# Patient Record
Sex: MALE | Race: WHITE
[De-identification: names, ages, dates, MRNs, and addresses within clinical notes are randomized per-mention and may not be internally consistent; named-entity substitution may affect disease eponyms.]

---

## 2021-01-12 NOTE — RAD
XR IVP Retrograde



History: Stent placement



Comparison: Abdomen pelvis CT December 2020



Findings: 9 images were obtained from the procedural suite. Replacement left double-J ureteral stent 
with continued left hydronephrosis. Large calculus proximal left ureter.



Impression: Fluoroscopy for procedure purposes.



Reported By: Milton Jeff 

Electronically Signed:  1/12/2021 1:20 PM

## 2021-01-12 NOTE — OP
DATE OF PROCEDURE:  01/12/2021



PREOPERATIVE DIAGNOSIS:  Left renal stone.



POSTOPERATIVE DIAGNOSIS:  Left renal stone.



PROCEDURES PERFORMED:  Left ureteroscopy, laser lithotripsy, basket extraction,

retrograde pyelogram, intraoperative interpretation of radiologic imaging, and 4.8 x

26 double-J ureteral stent with string placement. 



ANESTHESIA:  General.



COMPLICATIONS:  None.



ESTIMATED BLOOD LOSS:  Minimal.



SPECIMEN:  Left renal stone fragments.



DESCRIPTION OF PROCEDURE:  After informed consent, the patient was taken to the

operating room, transferred to the table under his own power.  Anesthesia was

established.  A time-out was performed, showing the correct patient, site, and

procedure.  Preoperative antibiotics were administered.  He was prepped and draped

in the lithotomy position. 



The rigid cystoscope was advanced through the urethra, noting normal course and

caliber of the urethra down to the prostate, noting mildly coapting lateral lobes

and a partially-obstructing median lobe with lateral sulcus on the left side.  The

bladder was entered and the left ureteral orifice cannulated with a wire, which was

passed up to the level of the renal pelvis under fluoroscopic guidance, noting the

stone easily visible under fluoroscopy.  An access sheath was placed over the wire

into the proximal ureter under fluoroscopic guidance and a retrograde pyelogram

performed through this showing good filling of the renal pelvis without obvious

hydronephrosis.  The flexible ureteroscope was passed through the access sheath into

the renal pelvis, where the stone was quickly encountered. 



273 micron ball-tipped laser fiber was used to dust the stone into only a few

clinically significant fragments, which were removed with a 1.9-cm Nitinol basket.

The collecting system was then re-examined, noting no clinically significant stone

fragments remaining.  The collecting system was filled with contrast and then the

scope and access sheath removed leaving a wire in place.  A 4.8 x 26 double-J

ureteral stent was passed over the wire with a curl in the kidney and curl in the

bladder.  Strings were taped to his penis with a Tegaderm for removal in 5 days.

Completion image was taken.  He was awoken from anesthesia, transferred back to his

hospital bed and taken to PACU in stable condition, where he will be discharged home

upon recovery. 







Job ID:  [id number]

## 2021-03-01 ENCOUNTER — HOSPITAL ENCOUNTER (OUTPATIENT)
Dept: HOSPITAL 92 - LABBT | Age: 71
Discharge: HOME | End: 2021-03-01
Attending: OPHTHALMOLOGY
Payer: MEDICARE

## 2021-03-01 DIAGNOSIS — H35.89: ICD-10-CM

## 2021-03-01 DIAGNOSIS — Z20.822: ICD-10-CM

## 2021-03-01 DIAGNOSIS — Z01.812: Primary | ICD-10-CM

## 2021-03-01 PROCEDURE — U0005 INFEC AGEN DETEC AMPLI PROBE: HCPCS

## 2021-03-01 PROCEDURE — U0003 INFECTIOUS AGENT DETECTION BY NUCLEIC ACID (DNA OR RNA); SEVERE ACUTE RESPIRATORY SYNDROME CORONAVIRUS 2 (SARS-COV-2) (CORONAVIRUS DISEASE [COVID-19]), AMPLIFIED PROBE TECHNIQUE, MAKING USE OF HIGH THROUGHPUT TECHNOLOGIES AS DESCRIBED BY CMS-2020-01-R: HCPCS

## 2021-03-01 PROCEDURE — 87635 SARS-COV-2 COVID-19 AMP PRB: CPT

## 2021-03-04 ENCOUNTER — HOSPITAL ENCOUNTER (OUTPATIENT)
Dept: HOSPITAL 92 - SDC | Age: 71
Discharge: HOME | End: 2021-03-04
Attending: OPHTHALMOLOGY
Payer: MEDICARE

## 2021-03-04 VITALS — BODY MASS INDEX: 34.2 KG/M2

## 2021-03-04 DIAGNOSIS — Z79.899: ICD-10-CM

## 2021-03-04 DIAGNOSIS — H35.371: Primary | ICD-10-CM

## 2021-03-04 DIAGNOSIS — Z79.82: ICD-10-CM

## 2021-03-04 DIAGNOSIS — Z91.030: ICD-10-CM

## 2021-03-04 PROCEDURE — 08T43ZZ RESECTION OF RIGHT VITREOUS, PERCUTANEOUS APPROACH: ICD-10-PCS | Performed by: OPHTHALMOLOGY

## 2021-03-04 PROCEDURE — 08NE3ZZ RELEASE RIGHT RETINA, PERCUTANEOUS APPROACH: ICD-10-PCS | Performed by: OPHTHALMOLOGY

## 2021-03-05 NOTE — OP
DATE OF PROCEDURE:  03/04/2021



PREOPERATIVE DIAGNOSIS:  Epiretinal membrane, right eye.



POSTOPERATIVE DIAGNOSIS:  Epiretinal membrane, right eye.



PROCEDURES PERFORMED:  Pars plana vitrectomy and membrane peel, right eye.



ANESTHESIA:  Local with monitored anesthesia care.



DESCRIPTION OF PROCEDURE:  The patient was identified in the preoperative holding

area.  Appropriate informed consent for the planned surgical procedure on the right

eye had been obtained.  The patient was transported to the operative suite.

Appropriate cardiopulmonary monitoring was established.  Local anesthesia was

obtained using retrobulbar modified Van Lint lid block using 50:50 mixture of 4%

lidocaine and 0.75% bupivacaine.  The patient was prepped and draped in usual

sterile manner for ophthalmic surgery on the right eye.  Lid speculum was placed in

the right eye.  A 27-gauge trocar was placed in the conjunctiva and sclera

superotemporally, inferotemporally, and supranasally.  Infusion line was placed

inferotemporally.  Light pipe vitreous cutter inserted to the eye.  Core vitrectomy

was performed.  Indocyanine green dye was infused on the posterior pole x1,

identifying the epiretinal membrane.  This was elevated using membrane scraper and

peeled across the macula using end gripping forceps.  Indirect ophthalmoscopy was

used to examine the retina 360 degrees.  No holes, breaks, or tears were identified.

 Prophylactic laser was placed behind the sclerotomy sites.  Trocars removed.  Eye

was noted to retain pressure well.  Retrobulbar Kenalog and subconjunctival Ancef

were placed.  Antibiotic ointment was placed.  The eye was patched and shielded.

The patient was taken to the postop recovery unit in good condition, having suffered

no immediate perioperative complications.  The patient was instructed to keep patch

and shield on, avoid lifting or bending.  Followup appointment with Dr. Argeulles. 







Job ID:  [id number]

## 2022-06-07 ENCOUNTER — HOSPITAL ENCOUNTER (OUTPATIENT)
Dept: HOSPITAL 92 - CSHRAD | Age: 72
Discharge: HOME | End: 2022-06-07
Attending: UROLOGY
Payer: MEDICARE

## 2022-06-07 DIAGNOSIS — N28.89: ICD-10-CM

## 2022-06-07 DIAGNOSIS — R82.991: Primary | ICD-10-CM

## 2022-06-07 PROCEDURE — 74018 RADEX ABDOMEN 1 VIEW: CPT

## 2022-06-23 ENCOUNTER — HOSPITAL ENCOUNTER (OUTPATIENT)
Dept: HOSPITAL 92 - LABBT | Age: 72
Discharge: HOME | End: 2022-06-23
Attending: UROLOGY
Payer: MEDICARE

## 2022-06-23 DIAGNOSIS — Z01.818: Primary | ICD-10-CM

## 2022-06-23 DIAGNOSIS — N40.1: ICD-10-CM

## 2022-06-23 DIAGNOSIS — Z20.822: ICD-10-CM

## 2022-06-23 LAB
ANION GAP SERPL CALC-SCNC: 16 MMOL/L (ref 10–20)
BUN SERPL-MCNC: 12 MG/DL (ref 8.4–25.7)
CALCIUM SERPL-MCNC: 9.3 MG/DL (ref 7.8–10.44)
CHLORIDE SERPL-SCNC: 103 MMOL/L (ref 98–107)
CO2 SERPL-SCNC: 25 MMOL/L (ref 23–31)
CREAT CL PREDICTED SERPL C-G-VRATE: 0 ML/MIN (ref 70–130)
GLUCOSE SERPL-MCNC: 103 MG/DL (ref 80–115)
HGB BLD-MCNC: 16.3 G/DL (ref 13.5–17.5)
MCH RBC QN AUTO: 30.9 PG (ref 27–33)
MCV RBC AUTO: 91.7 FL (ref 81.2–95.1)
PLATELET # BLD AUTO: 228 10X3/UL (ref 150–450)
POTASSIUM SERPL-SCNC: 4.8 MMOL/L (ref 3.5–5.1)
RBC # BLD AUTO: 5.27 10X6/UL (ref 4.32–5.72)
RBC UR QL AUTO: (no result) HPF (ref 0–3)
SODIUM SERPL-SCNC: 139 MMOL/L (ref 136–145)
SP GR UR STRIP: 1.02 (ref 1–1.04)
WBC # BLD AUTO: 5.8 10X3/UL (ref 3.5–10.5)
WBC UR QL AUTO: (no result) HPF (ref 0–3)

## 2022-06-23 PROCEDURE — 87086 URINE CULTURE/COLONY COUNT: CPT

## 2022-06-23 PROCEDURE — 93005 ELECTROCARDIOGRAM TRACING: CPT

## 2022-06-23 PROCEDURE — 81001 URINALYSIS AUTO W/SCOPE: CPT

## 2022-06-23 PROCEDURE — 93010 ELECTROCARDIOGRAM REPORT: CPT

## 2022-06-23 PROCEDURE — 85027 COMPLETE CBC AUTOMATED: CPT

## 2022-06-23 PROCEDURE — U0005 INFEC AGEN DETEC AMPLI PROBE: HCPCS

## 2022-06-23 PROCEDURE — U0003 INFECTIOUS AGENT DETECTION BY NUCLEIC ACID (DNA OR RNA); SEVERE ACUTE RESPIRATORY SYNDROME CORONAVIRUS 2 (SARS-COV-2) (CORONAVIRUS DISEASE [COVID-19]), AMPLIFIED PROBE TECHNIQUE, MAKING USE OF HIGH THROUGHPUT TECHNOLOGIES AS DESCRIBED BY CMS-2020-01-R: HCPCS

## 2022-06-23 PROCEDURE — 80048 BASIC METABOLIC PNL TOTAL CA: CPT

## 2022-06-28 ENCOUNTER — HOSPITAL ENCOUNTER (OUTPATIENT)
Dept: HOSPITAL 92 - SDC | Age: 72
Discharge: HOME | End: 2022-06-28
Attending: UROLOGY
Payer: MEDICARE

## 2022-06-28 VITALS — BODY MASS INDEX: 33.5 KG/M2

## 2022-06-28 DIAGNOSIS — E03.9: ICD-10-CM

## 2022-06-28 DIAGNOSIS — E29.1: ICD-10-CM

## 2022-06-28 DIAGNOSIS — N52.01: ICD-10-CM

## 2022-06-28 DIAGNOSIS — Z91.030: ICD-10-CM

## 2022-06-28 DIAGNOSIS — Z79.890: ICD-10-CM

## 2022-06-28 DIAGNOSIS — Z79.899: ICD-10-CM

## 2022-06-28 DIAGNOSIS — N40.1: Primary | ICD-10-CM

## 2022-06-28 DIAGNOSIS — Z79.82: ICD-10-CM

## 2022-06-28 PROCEDURE — L8699 PROSTHETIC IMPLANT NOS: HCPCS

## 2022-06-28 PROCEDURE — 0T7D8DZ DILATION OF URETHRA WITH INTRALUMINAL DEVICE, VIA NATURAL OR ARTIFICIAL OPENING ENDOSCOPIC: ICD-10-PCS | Performed by: UROLOGY
